# Patient Record
Sex: MALE | Race: WHITE | HISPANIC OR LATINO
[De-identification: names, ages, dates, MRNs, and addresses within clinical notes are randomized per-mention and may not be internally consistent; named-entity substitution may affect disease eponyms.]

---

## 2019-08-02 ENCOUNTER — APPOINTMENT (OUTPATIENT)
Dept: INTERNAL MEDICINE | Facility: CLINIC | Age: 39
End: 2019-08-02
Payer: COMMERCIAL

## 2019-08-02 VITALS
BODY MASS INDEX: 30.13 KG/M2 | HEIGHT: 67 IN | TEMPERATURE: 98.4 F | DIASTOLIC BLOOD PRESSURE: 74 MMHG | HEART RATE: 89 BPM | SYSTOLIC BLOOD PRESSURE: 119 MMHG | RESPIRATION RATE: 16 BRPM | OXYGEN SATURATION: 96 % | WEIGHT: 192 LBS

## 2019-08-02 DIAGNOSIS — Z78.9 OTHER SPECIFIED HEALTH STATUS: ICD-10-CM

## 2019-08-02 DIAGNOSIS — Z87.09 PERSONAL HISTORY OF OTHER DISEASES OF THE RESPIRATORY SYSTEM: ICD-10-CM

## 2019-08-02 DIAGNOSIS — L50.9 URTICARIA, UNSPECIFIED: ICD-10-CM

## 2019-08-02 DIAGNOSIS — Z87.39 PERSONAL HISTORY OF OTHER DISEASES OF THE MUSCULOSKELETAL SYSTEM AND CONNECTIVE TISSUE: ICD-10-CM

## 2019-08-02 DIAGNOSIS — J30.2 OTHER SEASONAL ALLERGIC RHINITIS: ICD-10-CM

## 2019-08-02 DIAGNOSIS — R21 RASH AND OTHER NONSPECIFIC SKIN ERUPTION: ICD-10-CM

## 2019-08-02 PROCEDURE — 90471 IMMUNIZATION ADMIN: CPT

## 2019-08-02 PROCEDURE — 99204 OFFICE O/P NEW MOD 45 MIN: CPT | Mod: 25

## 2019-08-02 PROCEDURE — 99386 PREV VISIT NEW AGE 40-64: CPT | Mod: 25

## 2019-08-02 PROCEDURE — 90732 PPSV23 VACC 2 YRS+ SUBQ/IM: CPT

## 2019-08-02 PROCEDURE — 90715 TDAP VACCINE 7 YRS/> IM: CPT

## 2019-08-02 PROCEDURE — G0009: CPT | Mod: 59

## 2019-08-02 NOTE — HISTORY OF PRESENT ILLNESS
[FreeTextEntry1] : establish care [de-identified] : 38yo male, works as , [employer] persistent asthma, gout, environmental allergies presents to establish care\par Patient had been seen at [facility] for many years but no longer accepted his insurance\par \par endorses chronic itchy rash on arms and legs, prescribed fluocinonide gel, not particularly effective\par had allergy panel in past performed -> has allergy to cats but lives with cousin, who has 2 cats; will move out soon\par \par seen in  recently for right toe gout, prescribed colchicine, and pain/swelling resolved.  currently on daily 0.6mg colchicine dose. has ahd 2 gout attacks. \par \par asthma is stable.  last asthma exacerbation in 2005 when seen in ED; never intubated; has had 3 exacerbations requiring hospital care\par \par denies chest pain, orthopnea, leg swelling.  endorses occ SOB with exertion (unchanged), cramping abdominal pain with dairy and occ knee pain.

## 2019-08-02 NOTE — HEALTH RISK ASSESSMENT
[No] : No [0] : 2) Feeling down, depressed, or hopeless: Not at all (0) [Fully functional (bathing, dressing, toileting, transferring, walking, feeding)] : Fully functional (bathing, dressing, toileting, transferring, walking, feeding) [HIV Test offered] : HIV Test offered [Fully functional (using the telephone, shopping, preparing meals, housekeeping, doing laundry, using] : Fully functional and needs no help or supervision to perform IADLs (using the telephone, shopping, preparing meals, housekeeping, doing laundry, using transportation, managing medications and managing finances) [] : No [[id number]] : 0 [Reports changes in hearing] : Reports no changes in hearing [Reports changes in vision] : Reports no changes in vision [Reports changes in dental health] : Reports no changes in dental health

## 2019-08-02 NOTE — ASSESSMENT
[FreeTextEntry1] : received Pneumovax and Tdap vaccines\par routine labs, will call patient with results

## 2019-08-02 NOTE — PHYSICAL EXAM
[No Acute Distress] : no acute distress [Well Nourished] : well nourished [Well Developed] : well developed [Normal Sclera/Conjunctiva] : normal sclera/conjunctiva [PERRL] : pupils equal round and reactive to light [Well-Appearing] : well-appearing [Normal Outer Ear/Nose] : the outer ears and nose were normal in appearance [EOMI] : extraocular movements intact [Normal Oropharynx] : the oropharynx was normal [No JVD] : no jugular venous distention [No Lymphadenopathy] : no lymphadenopathy [Supple] : supple [No Respiratory Distress] : no respiratory distress  [Thyroid Normal, No Nodules] : the thyroid was normal and there were no nodules present [No Accessory Muscle Use] : no accessory muscle use [Clear to Auscultation] : lungs were clear to auscultation bilaterally [Normal Rate] : normal rate  [Regular Rhythm] : with a regular rhythm [Normal S1, S2] : normal S1 and S2 [No Carotid Bruits] : no carotid bruits [No Murmur] : no murmur heard [No Abdominal Bruit] : a ~M bruit was not heard ~T in the abdomen [No Varicosities] : no varicosities [Pedal Pulses Present] : the pedal pulses are present [No Palpable Aorta] : no palpable aorta [No Edema] : there was no peripheral edema [No Extremity Clubbing/Cyanosis] : no extremity clubbing/cyanosis [Soft] : abdomen soft [Non-distended] : non-distended [Non Tender] : non-tender [No HSM] : no HSM [No Masses] : no abdominal mass palpated [Normal Bowel Sounds] : normal bowel sounds [Normal Posterior Cervical Nodes] : no posterior cervical lymphadenopathy [No Spinal Tenderness] : no spinal tenderness [Normal Anterior Cervical Nodes] : no anterior cervical lymphadenopathy [No CVA Tenderness] : no CVA  tenderness [No Joint Swelling] : no joint swelling [Grossly Normal Strength/Tone] : grossly normal strength/tone [Coordination Grossly Intact] : coordination grossly intact [Normal Gait] : normal gait [No Focal Deficits] : no focal deficits [Normal Insight/Judgement] : insight and judgment were intact [Normal Affect] : the affect was normal [de-identified] : right 1st toe metacarpal joint without redness or swelling [de-identified] : papular rash with excoriation b/l arms and neck

## 2019-08-02 NOTE — REVIEW OF SYSTEMS
[Shortness Of Breath] : shortness of breath [Joint Pain] : joint pain [Joint Swelling] : joint swelling [Negative] : Heme/Lymph

## 2019-08-05 ENCOUNTER — LABORATORY RESULT (OUTPATIENT)
Age: 39
End: 2019-08-05

## 2019-08-08 ENCOUNTER — FORM ENCOUNTER (OUTPATIENT)
Age: 39
End: 2019-08-08

## 2019-08-08 LAB
25(OH)D3 SERPL-MCNC: 22.3 NG/ML
ALBUMIN SERPL ELPH-MCNC: 4.7 G/DL
ALP BLD-CCNC: 57 U/L
ALT SERPL-CCNC: 22 U/L
ANION GAP SERPL CALC-SCNC: 14 MMOL/L
AST SERPL-CCNC: 16 U/L
BASOPHILS # BLD AUTO: 0.07 K/UL
BASOPHILS NFR BLD AUTO: 1.3 %
BILIRUB SERPL-MCNC: 0.5 MG/DL
BUN SERPL-MCNC: 17 MG/DL
CALCIUM SERPL-MCNC: 10.1 MG/DL
CHLORIDE SERPL-SCNC: 101 MMOL/L
CHOLEST SERPL-MCNC: 181 MG/DL
CHOLEST/HDLC SERPL: 3.9 RATIO
CO2 SERPL-SCNC: 26 MMOL/L
CREAT SERPL-MCNC: 1.04 MG/DL
EOSINOPHIL # BLD AUTO: 0.32 K/UL
EOSINOPHIL NFR BLD AUTO: 5.9 %
ESTIMATED AVERAGE GLUCOSE: 97 MG/DL
GLUCOSE SERPL-MCNC: 85 MG/DL
HBA1C MFR BLD HPLC: 5 %
HCT VFR BLD CALC: 50.5 %
HDLC SERPL-MCNC: 46 MG/DL
HGB BLD-MCNC: 16.3 G/DL
HIV1+2 AB SPEC QL IA.RAPID: NONREACTIVE
IMM GRANULOCYTES NFR BLD AUTO: 0.4 %
LDLC SERPL CALC-MCNC: 112 MG/DL
LYMPHOCYTES # BLD AUTO: 1.77 K/UL
LYMPHOCYTES NFR BLD AUTO: 32.8 %
MAN DIFF?: NORMAL
MCHC RBC-ENTMCNC: 29.4 PG
MCHC RBC-ENTMCNC: 32.3 GM/DL
MCV RBC AUTO: 91.2 FL
MONOCYTES # BLD AUTO: 0.43 K/UL
MONOCYTES NFR BLD AUTO: 8 %
NEUTROPHILS # BLD AUTO: 2.78 K/UL
NEUTROPHILS NFR BLD AUTO: 51.6 %
PLATELET # BLD AUTO: 278 K/UL
POTASSIUM SERPL-SCNC: 4.5 MMOL/L
PROT SERPL-MCNC: 7.8 G/DL
RBC # BLD: 5.54 M/UL
RBC # FLD: 12.5 %
SODIUM SERPL-SCNC: 141 MMOL/L
TRIGL SERPL-MCNC: 117 MG/DL
TSH SERPL-ACNC: 1.81 UIU/ML
URATE SERPL-MCNC: 9 MG/DL
WBC # FLD AUTO: 5.39 K/UL

## 2019-08-08 RX ORDER — ALBUTEROL SULFATE 90 UG/1
108 (90 BASE) AEROSOL, METERED RESPIRATORY (INHALATION)
Qty: 1 | Refills: 5 | Status: ACTIVE | COMMUNITY
Start: 2019-08-08 | End: 1900-01-01

## 2019-08-09 ENCOUNTER — OUTPATIENT (OUTPATIENT)
Dept: OUTPATIENT SERVICES | Facility: HOSPITAL | Age: 39
LOS: 1 days | End: 2019-08-09
Payer: COMMERCIAL

## 2019-08-09 DIAGNOSIS — M10.9 GOUT, UNSPECIFIED: ICD-10-CM

## 2019-08-09 PROCEDURE — 73630 X-RAY EXAM OF FOOT: CPT | Mod: 26,RT

## 2019-08-09 PROCEDURE — 73630 X-RAY EXAM OF FOOT: CPT

## 2019-08-13 LAB
A ALTERNATA IGE QN: 0.13 KUA/L
A FUMIGATUS IGE QN: <0.1 KUA/L
BERMUDA GRASS IGE QN: 42.7 KUA/L
BOXELDER IGE QN: 7.14 KUA/L
C HERBARUM IGE QN: <0.1 KUA/L
CALIF WALNUT IGE QN: 21.9 KUA/L
CAT DANDER IGE QN: >100 KUA/L
CMN PIGWEED IGE QN: 25.8 KUA/L
COMMON RAGWEED IGE QN: 11.2 KUA/L
COTTONWOOD IGE QN: 11.1 KUA/L
D FARINAE IGE QN: 11 KUA/L
D PTERONYSS IGE QN: 11.4 KUA/L
DEPRECATED A ALTERNATA IGE RAST QL: NORMAL
DEPRECATED A FUMIGATUS IGE RAST QL: 0
DEPRECATED BERMUDA GRASS IGE RAST QL: 4
DEPRECATED BOXELDER IGE RAST QL: 3
DEPRECATED C HERBARUM IGE RAST QL: 0
DEPRECATED CAT DANDER IGE RAST QL: 6
DEPRECATED COMMON PIGWEED IGE RAST QL: 4
DEPRECATED COMMON RAGWEED IGE RAST QL: 3
DEPRECATED COTTONWOOD IGE RAST QL: 3
DEPRECATED D FARINAE IGE RAST QL: 3
DEPRECATED D PTERONYSS IGE RAST QL: 3
DEPRECATED DOG DANDER IGE RAST QL: 5
DEPRECATED GOOSEFOOT IGE RAST QL: 4
DEPRECATED LONDON PLANE IGE RAST QL: 4
DEPRECATED MUGWORT IGE RAST QL: 3
DEPRECATED P NOTATUM IGE RAST QL: 0
DEPRECATED RED CEDAR IGE RAST QL: 2
DEPRECATED ROACH IGE RAST QL: 3
DEPRECATED SHEEP SORREL IGE RAST QL: 4
DEPRECATED SILVER BIRCH IGE RAST QL: 4
DEPRECATED TIMOTHY IGE RAST QL: 5
DEPRECATED WHITE ASH IGE RAST QL: 4
DEPRECATED WHITE OAK IGE RAST QL: 4
DOG DANDER IGE QN: 60.8 KUA/L
GOOSEFOOT IGE QN: 18.7 KUA/L
LONDON PLANE IGE QN: 32.7 KUA/L
MUGWORT IGE QN: 8.26 KUA/L
MULBERRY (T70) CLASS: 3
MULBERRY (T70) CONC: 12 KUA/L
P NOTATUM IGE QN: <0.1 KUA/L
RED CEDAR IGE QN: 2.68 KUA/L
ROACH IGE QN: 6.37 KUA/L
SHEEP SORREL IGE QN: 28.5 KUA/L
SILVER BIRCH IGE QN: 18.4 KUA/L
TIMOTHY IGE QN: 64.5 KUA/L
TREE ALLERG MIX1 IGE QL: 4
WHITE ASH IGE QN: 31.3 KUA/L
WHITE ELM IGE QN: 1.17 KUA/L
WHITE ELM IGE QN: 2
WHITE OAK IGE QN: 21.2 KUA/L

## 2019-09-12 ENCOUNTER — APPOINTMENT (OUTPATIENT)
Dept: DERMATOLOGY | Facility: CLINIC | Age: 39
End: 2019-09-12
Payer: COMMERCIAL

## 2019-09-12 VITALS
WEIGHT: 185 LBS | HEIGHT: 67 IN | BODY MASS INDEX: 29.03 KG/M2 | HEART RATE: 75 BPM | SYSTOLIC BLOOD PRESSURE: 126 MMHG | OXYGEN SATURATION: 96 % | DIASTOLIC BLOOD PRESSURE: 86 MMHG

## 2019-09-12 DIAGNOSIS — Z87.09 PERSONAL HISTORY OF OTHER DISEASES OF THE RESPIRATORY SYSTEM: ICD-10-CM

## 2019-09-12 DIAGNOSIS — Z63.5 DISRUPTION OF FAMILY BY SEPARATION AND DIVORCE: ICD-10-CM

## 2019-09-12 DIAGNOSIS — Z78.9 OTHER SPECIFIED HEALTH STATUS: ICD-10-CM

## 2019-09-12 DIAGNOSIS — Z84.0 FAMILY HISTORY OF DISEASES OF THE SKIN AND SUBCUTANEOUS TISSUE: ICD-10-CM

## 2019-09-12 DIAGNOSIS — Z91.89 OTHER SPECIFIED PERSONAL RISK FACTORS, NOT ELSEWHERE CLASSIFIED: ICD-10-CM

## 2019-09-12 PROCEDURE — 99203 OFFICE O/P NEW LOW 30 MIN: CPT

## 2019-09-12 RX ORDER — BENZOYL PEROXIDE 100 MG/ML
10 LIQUID TOPICAL DAILY
Qty: 1 | Refills: 9 | Status: ACTIVE | COMMUNITY
Start: 2019-09-12 | End: 1900-01-01

## 2019-09-12 SDOH — SOCIAL STABILITY - SOCIAL INSECURITY: DISRUPTION OF FAMILY BY SEPARATION AND DIVORCE: Z63.5

## 2019-09-13 ENCOUNTER — APPOINTMENT (OUTPATIENT)
Dept: INTERNAL MEDICINE | Facility: CLINIC | Age: 39
End: 2019-09-13
Payer: COMMERCIAL

## 2019-09-19 ENCOUNTER — APPOINTMENT (OUTPATIENT)
Dept: INTERNAL MEDICINE | Facility: CLINIC | Age: 39
End: 2019-09-19
Payer: COMMERCIAL

## 2019-09-19 VITALS
BODY MASS INDEX: 29.82 KG/M2 | TEMPERATURE: 98.4 F | WEIGHT: 190 LBS | SYSTOLIC BLOOD PRESSURE: 112 MMHG | RESPIRATION RATE: 16 BRPM | DIASTOLIC BLOOD PRESSURE: 77 MMHG | HEART RATE: 64 BPM | OXYGEN SATURATION: 97 % | HEIGHT: 67 IN

## 2019-09-19 PROCEDURE — 99214 OFFICE O/P EST MOD 30 MIN: CPT

## 2019-09-20 RX ORDER — ALBUTEROL SULFATE 4 MG/1
TABLET ORAL
Refills: 0 | Status: DISCONTINUED | COMMUNITY
End: 2019-09-20

## 2019-09-20 RX ORDER — FLUTICASONE PROPIONATE 50 UG/1
SPRAY, METERED NASAL
Refills: 0 | Status: DISCONTINUED | COMMUNITY
End: 2019-09-20

## 2019-09-20 NOTE — HISTORY OF PRESENT ILLNESS
[FreeTextEntry1] : gout follow up [de-identified] : 40yo male, works as , [employer] persistent asthma, gout, environmental allergies presents to establish care\par Patient had been seen at [facility] for many years but no longer accepted his insurance\par \par endorses chronic itchy rash on arms and legs, prescribed fluocinonide gel, not particularly effective\par had allergy panel in past performed -> has allergy to cats but lives with cousin, who has 2 cats; will move out soon\par \par seen in  recently for right toe gout, prescribed colchicine, and pain/swelling resolved.  currently on daily 0.6mg colchicine dose. has and 2 gout attacks. \par \par asthma is stable.  last asthma exacerbation in 2005 when seen in ED; never intubated; has had 3 exacerbations requiring hospital care\par \par INTERVAL HISTORY:\par allergies controlled\par allergist had recommended xyzal\par does not want to start allopurinol, would like to try dietary changes first\par right toe gout resolved\par would like flu vaccine

## 2020-01-10 ENCOUNTER — APPOINTMENT (OUTPATIENT)
Dept: INTERNAL MEDICINE | Facility: CLINIC | Age: 40
End: 2020-01-10
Payer: COMMERCIAL

## 2020-01-10 VITALS
OXYGEN SATURATION: 96 % | WEIGHT: 183 LBS | SYSTOLIC BLOOD PRESSURE: 126 MMHG | HEART RATE: 70 BPM | DIASTOLIC BLOOD PRESSURE: 82 MMHG | RESPIRATION RATE: 16 BRPM | BODY MASS INDEX: 28.72 KG/M2 | TEMPERATURE: 98.1 F | HEIGHT: 67 IN

## 2020-01-10 DIAGNOSIS — J30.89 OTHER ALLERGIC RHINITIS: ICD-10-CM

## 2020-01-10 DIAGNOSIS — L70.9 ACNE, UNSPECIFIED: ICD-10-CM

## 2020-01-10 PROCEDURE — 99214 OFFICE O/P EST MOD 30 MIN: CPT

## 2020-01-10 RX ORDER — CLINDAMYCIN PHOSPHATE 10 MG/ML
1 LOTION TOPICAL DAILY
Qty: 1 | Refills: 0 | Status: ACTIVE | COMMUNITY
Start: 2020-01-10 | End: 1900-01-01

## 2020-01-10 RX ORDER — TRIAMCINOLONE ACETONIDE 1 MG/G
0.1 OINTMENT TOPICAL
Qty: 1 | Refills: 3 | Status: DISCONTINUED | COMMUNITY
Start: 2019-08-02 | End: 2020-01-10

## 2020-01-10 RX ORDER — FLUTICASONE PROPION/SALMETEROL 500-50 MCG
BLISTER, WITH INHALATION DEVICE INHALATION
Refills: 0 | Status: DISCONTINUED | COMMUNITY
End: 2020-01-10

## 2020-01-10 NOTE — HISTORY OF PRESENT ILLNESS
[de-identified] : 38yo male, works as , [facility] persistent asthma, gout, environmental allergies presents for follow up after recurrent gout flare\par patient had recurrent gout flare while on vacation after eating pork -> resolved after colchicine, declined allopurinol after \par asthma under control on maintenance Advair, uses rescue inhaler infrequently\par likely moving out of apartment with cats, keeps cats out of bedroom\par facial acne still persistent despite BPO

## 2020-05-18 ENCOUNTER — RX CHANGE (OUTPATIENT)
Age: 40
End: 2020-05-18

## 2020-08-21 ENCOUNTER — APPOINTMENT (OUTPATIENT)
Dept: INTERNAL MEDICINE | Facility: CLINIC | Age: 40
End: 2020-08-21
Payer: COMMERCIAL

## 2020-08-21 VITALS
SYSTOLIC BLOOD PRESSURE: 117 MMHG | HEART RATE: 71 BPM | HEIGHT: 67 IN | WEIGHT: 174 LBS | BODY MASS INDEX: 27.31 KG/M2 | DIASTOLIC BLOOD PRESSURE: 72 MMHG | RESPIRATION RATE: 16 BRPM | OXYGEN SATURATION: 97 % | TEMPERATURE: 98.1 F

## 2020-08-21 DIAGNOSIS — M10.9 GOUT, UNSPECIFIED: ICD-10-CM

## 2020-08-21 DIAGNOSIS — Z00.00 ENCOUNTER FOR GENERAL ADULT MEDICAL EXAMINATION W/OUT ABNORMAL FINDINGS: ICD-10-CM

## 2020-08-21 DIAGNOSIS — J45.30 MILD PERSISTENT ASTHMA, UNCOMPLICATED: ICD-10-CM

## 2020-08-21 PROCEDURE — 99396 PREV VISIT EST AGE 40-64: CPT

## 2020-08-23 PROBLEM — J45.30 ASTHMA, MILD PERSISTENT: Status: ACTIVE | Noted: 2019-08-02

## 2020-08-23 PROBLEM — M10.9 GOUT: Status: ACTIVE | Noted: 2019-08-02

## 2020-08-23 RX ORDER — COLCHICINE 0.6 MG/1
0.6 TABLET ORAL TWICE DAILY
Qty: 60 | Refills: 3 | Status: DISCONTINUED | COMMUNITY
Start: 1900-01-01 | End: 2020-08-23

## 2020-08-23 NOTE — PHYSICAL EXAM
[No Acute Distress] : no acute distress [Well Nourished] : well nourished [Well Developed] : well developed [Well-Appearing] : well-appearing [Normal Sclera/Conjunctiva] : normal sclera/conjunctiva [PERRL] : pupils equal round and reactive to light [EOMI] : extraocular movements intact [Normal Oropharynx] : the oropharynx was normal [Normal Outer Ear/Nose] : the outer ears and nose were normal in appearance [No JVD] : no jugular venous distention [Supple] : supple [No Lymphadenopathy] : no lymphadenopathy [Thyroid Normal, No Nodules] : the thyroid was normal and there were no nodules present [No Accessory Muscle Use] : no accessory muscle use [Clear to Auscultation] : lungs were clear to auscultation bilaterally [No Respiratory Distress] : no respiratory distress  [Regular Rhythm] : with a regular rhythm [Normal S1, S2] : normal S1 and S2 [Normal Rate] : normal rate  [No Murmur] : no murmur heard [No Abdominal Bruit] : a ~M bruit was not heard ~T in the abdomen [No Carotid Bruits] : no carotid bruits [No Palpable Aorta] : no palpable aorta [Pedal Pulses Present] : the pedal pulses are present [No Varicosities] : no varicosities [No Edema] : there was no peripheral edema [No Extremity Clubbing/Cyanosis] : no extremity clubbing/cyanosis [Soft] : abdomen soft [Non Tender] : non-tender [No Masses] : no abdominal mass palpated [No HSM] : no HSM [Non-distended] : non-distended [Normal Anterior Cervical Nodes] : no anterior cervical lymphadenopathy [Normal Posterior Cervical Nodes] : no posterior cervical lymphadenopathy [No CVA Tenderness] : no CVA  tenderness [Normal Bowel Sounds] : normal bowel sounds [Grossly Normal Strength/Tone] : grossly normal strength/tone [No Joint Swelling] : no joint swelling [No Spinal Tenderness] : no spinal tenderness [Coordination Grossly Intact] : coordination grossly intact [No Rash] : no rash [No Focal Deficits] : no focal deficits [Normal Gait] : normal gait [Normal Insight/Judgement] : insight and judgment were intact [Normal Affect] : the affect was normal

## 2020-08-23 NOTE — HISTORY OF PRESENT ILLNESS
[de-identified] : 39yo male, works as , [employer] persistent asthma, gout, environmental allergies presents for annual exam\par \par has lost weight with dietary changes\par \par has not had recurrent gout flare\par \par asthma and acne controlled\par moving to new apartment\par \par had flu like sx in 3/20 - would like COVID Ab \par \par feels isolated during pandemic - not ready to speak with psychologist, no SI

## 2020-08-25 LAB
ANION GAP SERPL CALC-SCNC: 11 MMOL/L
BUN SERPL-MCNC: 10 MG/DL
CALCIUM SERPL-MCNC: 9.7 MG/DL
CHLORIDE SERPL-SCNC: 104 MMOL/L
CHOLEST SERPL-MCNC: 152 MG/DL
CHOLEST/HDLC SERPL: 3.2 RATIO
CO2 SERPL-SCNC: 26 MMOL/L
CREAT SERPL-MCNC: 0.96 MG/DL
GLUCOSE SERPL-MCNC: 82 MG/DL
HDLC SERPL-MCNC: 47 MG/DL
LDLC SERPL CALC-MCNC: 86 MG/DL
POTASSIUM SERPL-SCNC: 4.5 MMOL/L
SARS-COV-2 IGG SERPL IA-ACNC: 0.15 INDEX
SARS-COV-2 IGG SERPL QL IA: NEGATIVE
SODIUM SERPL-SCNC: 141 MMOL/L
TRIGL SERPL-MCNC: 95 MG/DL
URATE SERPL-MCNC: 5.1 MG/DL

## 2020-08-28 ENCOUNTER — RX CHANGE (OUTPATIENT)
Age: 40
End: 2020-08-28

## 2020-09-02 ENCOUNTER — RX RENEWAL (OUTPATIENT)
Age: 40
End: 2020-09-02

## 2020-10-09 ENCOUNTER — RX RENEWAL (OUTPATIENT)
Age: 40
End: 2020-10-09

## 2020-10-17 ENCOUNTER — APPOINTMENT (OUTPATIENT)
Dept: INTERNAL MEDICINE | Facility: CLINIC | Age: 40
End: 2020-10-17

## 2020-10-17 PROCEDURE — G0008: CPT

## 2020-10-17 PROCEDURE — 90688 IIV4 VACCINE SPLT 0.5 ML IM: CPT

## 2020-11-19 ENCOUNTER — APPOINTMENT (OUTPATIENT)
Dept: INTERNAL MEDICINE | Facility: CLINIC | Age: 40
End: 2020-11-19
Payer: COMMERCIAL

## 2020-11-19 VITALS
RESPIRATION RATE: 16 BRPM | TEMPERATURE: 97 F | WEIGHT: 174 LBS | HEIGHT: 67 IN | OXYGEN SATURATION: 96 % | HEART RATE: 89 BPM | SYSTOLIC BLOOD PRESSURE: 111 MMHG | DIASTOLIC BLOOD PRESSURE: 74 MMHG | BODY MASS INDEX: 27.31 KG/M2

## 2020-11-19 DIAGNOSIS — L30.9 DERMATITIS, UNSPECIFIED: ICD-10-CM

## 2020-11-19 DIAGNOSIS — M77.8 OTHER ENTHESOPATHIES, NOT ELSEWHERE CLASSIFIED: ICD-10-CM

## 2020-11-19 PROCEDURE — G0008: CPT

## 2020-11-19 PROCEDURE — 99214 OFFICE O/P EST MOD 30 MIN: CPT | Mod: 25

## 2020-11-19 PROCEDURE — 90686 IIV4 VACC NO PRSV 0.5 ML IM: CPT

## 2020-11-19 RX ORDER — LORATADINE 10 MG/1
10 CAPSULE, LIQUID FILLED ORAL
Refills: 0 | Status: ACTIVE | COMMUNITY
Start: 2020-11-19

## 2020-11-19 NOTE — PHYSICAL EXAM
[No Respiratory Distress] : no respiratory distress  [Normal Rate] : normal rate  [No Edema] : there was no peripheral edema [Normal] : affect was normal and insight and judgment were intact [de-identified] : tenderness and hypertrophy tendon lateral right upper arm

## 2020-11-19 NOTE — HISTORY OF PRESENT ILLNESS
[FreeTextEntry1] : right arm pain [de-identified] : 41yo male, works as , [employer] persistent asthma, gout, environmental allergies presents for flu vaccine and routine follow up\par \par no asthma flare\par \par lifting boxed 1 month ago\par endorses pain along lateral aspect right arm\par no weakness or sensory deficit

## 2021-01-21 ENCOUNTER — NON-APPOINTMENT (OUTPATIENT)
Age: 41
End: 2021-01-21

## 2021-01-22 ENCOUNTER — APPOINTMENT (OUTPATIENT)
Dept: INTERNAL MEDICINE | Facility: CLINIC | Age: 41
End: 2021-01-22
Payer: COMMERCIAL

## 2021-01-22 DIAGNOSIS — U07.1 COVID-19: ICD-10-CM

## 2021-01-22 PROCEDURE — 99202 OFFICE O/P NEW SF 15 MIN: CPT | Mod: 95

## 2021-01-23 PROBLEM — U07.1 COVID-19 VIRUS INFECTION: Status: ACTIVE | Noted: 2021-01-21

## 2021-01-23 NOTE — HISTORY OF PRESENT ILLNESS
[FreeTextEntry1] : COVID [Home] : at home, [unfilled] , at the time of the visit. [Medical Office: ([facility])___] : at the medical office located in  [Verbal consent obtained from patient] : the patient, [unfilled] [de-identified] : 41yo male, , [facility] asthma, environmental allergies , gout, COVID infection presents for follow up COVID\par \par patient is feeling better\par chest pressure resolved\par no fever, cough\par declined remdesevir trial\par \par on zinc, VIt C and Vit D x 2 weeks

## 2021-01-23 NOTE — PHYSICAL EXAM
[Normal] : no acute distress, well nourished, well developed and well-appearing [No Respiratory Distress] : no respiratory distress  [de-identified] : loosk well, no respiratory distress

## 2021-10-21 NOTE — HISTORY OF PRESENT ILLNESS
[de-identified] : 39yo male, , [facility] asthma, environmental allergies , gout, COVID infection presents for follow up COVID\par \par patient is feeling better\par chest pressure resolved\par no fever, cough\par declined remdesevir trial\par \par on zinc, VIt C and Vit D x 2 weeks

## 2021-10-21 NOTE — HISTORY OF PRESENT ILLNESS
[de-identified] : 41yo male, , [facility] asthma, environmental allergies , gout, COVID infection presents for follow up COVID\par \par patient is feeling better\par chest pressure resolved\par no fever, cough\par declined remdesevir trial\par \par on zinc, VIt C and Vit D x 2 weeks

## 2021-10-22 ENCOUNTER — APPOINTMENT (OUTPATIENT)
Dept: INTERNAL MEDICINE | Facility: CLINIC | Age: 41
End: 2021-10-22
Payer: SELF-PAY

## 2021-10-22 VITALS
TEMPERATURE: 95 F | OXYGEN SATURATION: 99 % | BODY MASS INDEX: 27.31 KG/M2 | WEIGHT: 174 LBS | RESPIRATION RATE: 16 BRPM | HEIGHT: 67 IN | HEART RATE: 77 BPM | DIASTOLIC BLOOD PRESSURE: 68 MMHG | SYSTOLIC BLOOD PRESSURE: 119 MMHG

## 2021-10-22 PROCEDURE — 90686 IIV4 VACC NO PRSV 0.5 ML IM: CPT

## 2021-10-22 PROCEDURE — G0008: CPT

## 2021-10-22 RX ORDER — ALBUTEROL SULFATE 90 UG/1
108 (90 BASE) INHALANT RESPIRATORY (INHALATION)
Qty: 1 | Refills: 3 | Status: ACTIVE | COMMUNITY
Start: 2019-08-02 | End: 1900-01-01

## 2021-10-22 RX ORDER — FLUTICASONE PROPIONATE AND SALMETEROL 500; 50 UG/1; UG/1
500-50 POWDER RESPIRATORY (INHALATION)
Qty: 1 | Refills: 3 | Status: ACTIVE | COMMUNITY
Start: 2019-08-02 | End: 1900-01-01

## 2021-12-02 NOTE — HISTORY OF PRESENT ILLNESS
[de-identified] : 41yo male, works as , [employer] persistent asthma, gout, environmental allergies presents for flu vaccine and routine follow up\par \par no asthma flare\par \par lifting boxed 1 month ago\par endorses pain along lateral aspect right arm\par no weakness or sensory deficit

## 2021-12-03 ENCOUNTER — APPOINTMENT (OUTPATIENT)
Dept: INTERNAL MEDICINE | Facility: CLINIC | Age: 41
End: 2021-12-03